# Patient Record
Sex: MALE | Race: BLACK OR AFRICAN AMERICAN | NOT HISPANIC OR LATINO | Employment: OTHER | URBAN - METROPOLITAN AREA
[De-identification: names, ages, dates, MRNs, and addresses within clinical notes are randomized per-mention and may not be internally consistent; named-entity substitution may affect disease eponyms.]

---

## 2019-07-10 ENCOUNTER — ANCILLARY PROCEDURE (OUTPATIENT)
Dept: GENERAL RADIOLOGY | Facility: CLINIC | Age: 64
End: 2019-07-10
Attending: NURSE PRACTITIONER
Payer: MEDICARE

## 2019-07-10 ENCOUNTER — OFFICE VISIT (OUTPATIENT)
Dept: OPTOMETRY | Facility: CLINIC | Age: 64
End: 2019-07-10
Payer: MEDICARE

## 2019-07-10 ENCOUNTER — OFFICE VISIT (OUTPATIENT)
Dept: URGENT CARE | Facility: URGENT CARE | Age: 64
End: 2019-07-10
Payer: MEDICARE

## 2019-07-10 VITALS
HEART RATE: 87 BPM | OXYGEN SATURATION: 99 % | TEMPERATURE: 98.5 F | SYSTOLIC BLOOD PRESSURE: 168 MMHG | RESPIRATION RATE: 20 BRPM | BODY MASS INDEX: 37.13 KG/M2 | WEIGHT: 226.6 LBS | DIASTOLIC BLOOD PRESSURE: 88 MMHG

## 2019-07-10 DIAGNOSIS — H25.813 COMBINED FORMS OF AGE-RELATED CATARACT OF BOTH EYES: ICD-10-CM

## 2019-07-10 DIAGNOSIS — R10.84 ABDOMINAL PAIN, GENERALIZED: Primary | ICD-10-CM

## 2019-07-10 DIAGNOSIS — R05.3 COUGH, PERSISTENT: ICD-10-CM

## 2019-07-10 DIAGNOSIS — H43.811 PVD (POSTERIOR VITREOUS DETACHMENT), RIGHT: Primary | ICD-10-CM

## 2019-07-10 PROCEDURE — 99203 OFFICE O/P NEW LOW 30 MIN: CPT | Performed by: NURSE PRACTITIONER

## 2019-07-10 PROCEDURE — 99203 OFFICE O/P NEW LOW 30 MIN: CPT | Performed by: OPTOMETRIST

## 2019-07-10 PROCEDURE — 74019 RADEX ABDOMEN 2 VIEWS: CPT

## 2019-07-10 PROCEDURE — 71046 X-RAY EXAM CHEST 2 VIEWS: CPT

## 2019-07-10 RX ORDER — NAPROXEN SODIUM 220 MG
220 TABLET ORAL 2 TIMES DAILY WITH MEALS
COMMUNITY
End: 2024-07-23

## 2019-07-10 ASSESSMENT — CUP TO DISC RATIO
OD_RATIO: 0.25
OS_RATIO: 0.25

## 2019-07-10 ASSESSMENT — VISUAL ACUITY
OD_SC: 20/30
OS_SC: 20/50
METHOD: SNELLEN - LINEAR
OD_PH_SC: 20/25
OS_PH_SC: 20/25

## 2019-07-10 ASSESSMENT — ENCOUNTER SYMPTOMS
SORE THROAT: 0
NAUSEA: 0
VOMITING: 0
COUGH: 1
RHINORRHEA: 0
SHORTNESS OF BREATH: 0
FEVER: 0
CHILLS: 0
DIARRHEA: 0
DIAPHORESIS: 0
ABDOMINAL PAIN: 1

## 2019-07-10 ASSESSMENT — SLIT LAMP EXAM - LIDS
COMMENTS: NORMAL
COMMENTS: NORMAL

## 2019-07-10 ASSESSMENT — EXTERNAL EXAM - RIGHT EYE: OD_EXAM: NORMAL

## 2019-07-10 ASSESSMENT — TONOMETRY
OS_IOP_MMHG: 15
OD_IOP_MMHG: 15
IOP_METHOD: APPLANATION

## 2019-07-10 ASSESSMENT — CONF VISUAL FIELD
OS_NORMAL: 1
OD_NORMAL: 1

## 2019-07-10 ASSESSMENT — EXTERNAL EXAM - LEFT EYE: OS_EXAM: NORMAL

## 2019-07-10 NOTE — PROGRESS NOTES
SUBJECTIVE:   Maynor Calero is a 64 year old male presenting with a chief complaint of   Chief Complaint   Patient presents with     Requesting X-Ray's     Patient requesting stomach and chest x-ray's- has not been feeling well since he had colonoscopy done a couple years ago. Patient also ate at [employer] on Friday and became very sick after.        He is a new patient of [facility].    URI Adult    Onset of symptoms was 6 day(s) ago.  Course of illness is worsening.    Severity moderate  Current and Associated symptoms: cough - non-productive and fatigue  Treatment measures tried include None tried.  Predisposing factors include None.   Abdominal pain  Duration 1 week  Current symptoms: too much gas, mild generalized abdominal  pain  Severity: moderate  Treatment tried: Non    Review of Systems   Constitutional: Negative for chills, diaphoresis and fever.   HENT: Negative for congestion, ear pain, rhinorrhea and sore throat.    Respiratory: Positive for cough. Negative for shortness of breath.    Gastrointestinal: Positive for abdominal pain. Negative for diarrhea, nausea and vomiting.   All other systems reviewed and are negative.      History reviewed. No pertinent past medical history.  History reviewed. No pertinent family history.  Current Outpatient Medications   Medication Sig Dispense Refill     naproxen sodium (ALEVE) 220 MG tablet Take 220 mg by mouth 2 times daily (with meals)       ibuprofen (ADVIL,MOTRIN) 800 MG tablet Take 1 tablet (800 mg) by mouth every 8 hours as needed for pain (Patient not taking: Reported on 7/10/2019) 90 tablet 1     metoprolol (LOPRESSOR) 25 MG tablet Take 1 tablet (25 mg) by mouth 2 times daily (Patient not taking: Reported on 7/10/2019) 60 tablet 8     sildenafil (VIAGRA) 50 MG tablet Take 0.5-1 tablets by mouth daily as needed for erectile dysfunction. Take 30 min to 4 hours before intercourse.  Never use with nitroglycerin, terazosin or doxazosin. (Patient not taking:  Reported on 7/10/2019) 6 tablet 1     Social History     Tobacco Use     Smoking status: Never Smoker     Smokeless tobacco: Never Used   Substance Use Topics     Alcohol use: Yes       OBJECTIVE  /88   Pulse 87   Temp 98.5  F (36.9  C) (Oral)   Resp 20   Wt 102.8 kg (226 lb 9.6 oz)   SpO2 99%   BMI 37.13 kg/m      Physical Exam   Constitutional: He appears well-developed and well-nourished. No distress.   HENT:   Head: Normocephalic and atraumatic.   Right Ear: Tympanic membrane and external ear normal.   Left Ear: Tympanic membrane and external ear normal.   Mouth/Throat: Oropharynx is clear and moist.   Eyes: Pupils are equal, round, and reactive to light. EOM are normal.   Neck: Normal range of motion. Neck supple.   Cardiovascular: Normal rate and normal heart sounds.   Pulmonary/Chest: Effort normal and breath sounds normal. No respiratory distress.   Abdominal: Soft. Bowel sounds are normal. He exhibits no distension. There is no tenderness.   Lymphadenopathy:     He has no cervical adenopathy.   Neurological: He is alert. No cranial nerve deficit.   Skin: Skin is warm and dry. He is not diaphoretic.   Psychiatric: He has a normal mood and affect.   Nursing note and vitals reviewed.      ASSESSMENT:      ICD-10-CM    1. Abdominal pain, generalized R10.84 XR Abdomen 2 Views   2. Cough, persistent R05 XR Chest 2 Views        Medical Decision Making:    Differential Diagnosis:  Abdominal Pain: GERD/Ulcer, indigestion    Serious Comorbid Conditions:  Adult:  None    PLAN:  I discussed xray results with the patient.  I recommend follow up with PCP in 3 days or sooner if symptoms are getting worse  All questions are answered and patient is in agreement with treatment plan  Isabel Pulido  [facility]  Family Nurse Practitoner

## 2019-07-10 NOTE — LETTER
7/10/2019         RE: Maynor Calero  [address]  [place] [address]        Dear Colleague,    Thank you for referring your patient, Maynor Calero, to the [facility]. Please see a copy of my visit note below.    Chief Complaint   Patient presents with     Floaters       Do you wear contact lenses? No        Yanni Mckeon, Optometric Tech     See Review Of Systems   Eyes: floaters  Constitutional: No fevers, chills, or weight changes.      Medical, surgical and family histories reviewed and updated 7/10/2019.         OBJECTIVE: See Ophthalmology exam    ASSESSMENT:    ICD-10-CM    1. PVD (posterior vitreous detachment), right H43.811    2. 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  This can sometimes cause holes or tears in the retina.  The signs of a retinal detachment are flashes of light or a curtain coming over the vision. If you notice any of these changes please let me know right away.  If I am not available this is an emergency type situation that you would need to be seen.    Floaters are small specks or clouds that move in your vision. They may appear to dart away when you try to look directly at them. They are most noticeable in bright light when looking at a blank wall, a solid colored surface, or the jessika.    These happen with age as the vitreous gel ( the fluid that fills the eyeball), starts to become more liquified and shrinks to form clumps or strands. The gel then pulls away from the back of the retina leaving a clump of tissue where the vitreous was previously attached, causing a posterior vitreous detachment.   This is more common with people that are nearsighted or have undergone certain ocular surgeries, inflammatory conditions or experienced trauma.  You should always be checked by an eye  doctor right away if you have a sudden change in floaters, flashes, or change/ loss in peripheral vision. This could indicate a retinal tear, hole or detachment that could lead to permanent vision loss if not treated.    You have the formation of cataracts.  You may notice some blurred vision or glare with night driving.  It is important that you wear good sunglasses to protect your eyes from the ultraviolet light from the sun. I recommend that you return in 1 year for an eye exam unless there are any sudden changes in your vision.       Return to clinic for comprehensive exam at your convenience, or sooner if visual changes present.       Moe Geller O.D.  [address]. [facility] [place]  [address]    [phone]             Again, thank you for allowing me to participate in the care of your patient.        Sincerely,        Moe Geller, OD

## 2019-07-10 NOTE — PATIENT INSTRUCTIONS
You have a PVD- posterior vitreous detachment which is due to the gel of the eye shrinking and clumping together.  This can sometimes cause holes or tears in the retina.  The signs of a retinal detachment are flashes of light or a curtain coming over the vision. If you notice any of these changes please let me know right away.  If I am not available this is an emergency type situation that you would need to be seen.    Floaters are small specks or clouds that move in your vision. They may appear to dart away when you try to look directly at them. They are most noticeable in bright light when looking at a blank wall, a solid colored surface, or the jessika.    These happen with age as the vitreous gel ( the fluid that fills the eyeball), starts to become more liquified and shrinks to form clumps or strands. The gel then pulls away from the back of the retina leaving a clump of tissue where the vitreous was previously attached, causing a posterior vitreous detachment.   This is more common with people that are nearsighted or have undergone certain ocular surgeries, inflammatory conditions or experienced trauma.  You should always be checked by an eye doctor right away if you have a sudden change in floaters, flashes, or change/ loss in peripheral vision. This could indicate a retinal tear, hole or detachment that could lead to permanent vision loss if not treated.    You have the formation of cataracts.  You may notice some blurred vision or glare with night driving.  It is important that you wear good sunglasses to protect your eyes from the ultraviolet light from the sun. I recommend that you return in 1 year for an eye exam unless there are any sudden changes in your vision.       Return to clinic for comprehensive exam at your convenience, or sooner if visual changes present.       Moe Geller O.D.  [facility]  [address] [place] [address]. [place]  [address]    [phone]

## 2019-07-10 NOTE — PROGRESS NOTES
Chief Complaint   Patient presents with     Floaters       Do you wear contact lenses? Mey Mckeon, Optometric Tech     See Review Of Systems   Eyes: floaters  Constitutional: No fevers, chills, or weight changes.      Medical, surgical and family histories reviewed and updated 7/10/2019.         OBJECTIVE: See Ophthalmology exam    ASSESSMENT:    ICD-10-CM    1. PVD (posterior vitreous detachment), right H43.811    2. 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  This can sometimes cause holes or tears in the retina.  The signs of a retinal detachment are flashes of light or a curtain coming over the vision. If you notice any of these changes please let me know right away.  If I am not available this is an emergency type situation that you would need to be seen.    Floaters are small specks or clouds that move in your vision. They may appear to dart away when you try to look directly at them. They are most noticeable in bright light when looking at a blank wall, a solid colored surface, or the jessika.    These happen with age as the vitreous gel ( the fluid that fills the eyeball), starts to become more liquified and shrinks to form clumps or strands. The gel then pulls away from the back of the retina leaving a clump of tissue where the vitreous was previously attached, causing a posterior vitreous detachment.   This is more common with people that are nearsighted or have undergone certain ocular surgeries, inflammatory conditions or experienced trauma.  You should always be checked by an eye doctor right away if you have a sudden change in floaters, flashes, or change/ loss in peripheral vision. This could indicate a retinal tear, hole or detachment that could lead to permanent vision loss if not treated.    You have the formation of  cataracts.  You may notice some blurred vision or glare with night driving.  It is important that you wear good sunglasses to protect your eyes from the ultraviolet light from the sun. I recommend that you return in 1 year for an eye exam unless there are any sudden changes in your vision.       Return to clinic for comprehensive exam at your convenience, or sooner if visual changes present.       Moe Geller O.D.  [address]  [place]  [address]    [phone]

## 2023-10-03 ENCOUNTER — OFFICE VISIT (OUTPATIENT)
Dept: URGENT CARE | Facility: URGENT CARE | Age: 68
End: 2023-10-03
Payer: COMMERCIAL

## 2023-10-03 VITALS
RESPIRATION RATE: 16 BRPM | HEART RATE: 66 BPM | TEMPERATURE: 98.5 F | WEIGHT: 222 LBS | SYSTOLIC BLOOD PRESSURE: 167 MMHG | OXYGEN SATURATION: 100 % | DIASTOLIC BLOOD PRESSURE: 107 MMHG

## 2023-10-03 DIAGNOSIS — N50.89 SWELLING OF LEFT TESTICLE: Primary | ICD-10-CM

## 2023-10-03 PROCEDURE — 99207 PR NO CHARGE LOS: CPT | Performed by: NURSE PRACTITIONER

## 2023-10-07 ENCOUNTER — HOSPITAL ENCOUNTER (EMERGENCY)
Facility: CLINIC | Age: 68
Discharge: HOME | End: 2023-10-07
Attending: EMERGENCY MEDICINE | Admitting: EMERGENCY MEDICINE
Payer: COMMERCIAL

## 2023-10-07 ENCOUNTER — APPOINTMENT (OUTPATIENT)
Dept: ULTRASOUND IMAGING | Facility: CLINIC | Age: 68
End: 2023-10-07
Attending: EMERGENCY MEDICINE
Payer: COMMERCIAL

## 2023-10-07 VITALS
BODY MASS INDEX: 35.36 KG/M2 | WEIGHT: 220 LBS | RESPIRATION RATE: 16 BRPM | HEART RATE: 89 BPM | OXYGEN SATURATION: 97 % | HEIGHT: 66 IN | DIASTOLIC BLOOD PRESSURE: 89 MMHG | TEMPERATURE: 96.9 F | SYSTOLIC BLOOD PRESSURE: 177 MMHG

## 2023-10-07 DIAGNOSIS — N45.3 EPIDIDYMOORCHITIS: ICD-10-CM

## 2023-10-07 LAB
ALBUMIN UR-MCNC: NEGATIVE MG/DL
ANION GAP SERPL CALCULATED.3IONS-SCNC: 12 MMOL/L (ref 7–15)
APPEARANCE UR: CLEAR
BASO+EOS+MONOS # BLD AUTO: ABNORMAL 10*3/UL
BASO+EOS+MONOS NFR BLD AUTO: ABNORMAL %
BASOPHILS # BLD AUTO: 0 10E3/UL (ref 0–0.2)
BASOPHILS NFR BLD AUTO: 0 %
BILIRUB UR QL STRIP: NEGATIVE
BUN SERPL-MCNC: 8.6 MG/DL (ref 8–23)
CALCIUM SERPL-MCNC: 9.6 MG/DL (ref 8.8–10.2)
CHLORIDE SERPL-SCNC: 102 MMOL/L (ref 98–107)
COLOR UR AUTO: ABNORMAL
CREAT SERPL-MCNC: 1.06 MG/DL (ref 0.67–1.17)
CRP SERPL-MCNC: 176.01 MG/L
DEPRECATED HCO3 PLAS-SCNC: 23 MMOL/L (ref 22–29)
EGFRCR SERPLBLD CKD-EPI 2021: 75 ML/MIN/1.73M2
EOSINOPHIL # BLD AUTO: 0.2 10E3/UL (ref 0–0.7)
EOSINOPHIL NFR BLD AUTO: 2 %
ERYTHROCYTE [DISTWIDTH] IN BLOOD BY AUTOMATED COUNT: 12.4 % (ref 10–15)
ERYTHROCYTE [SEDIMENTATION RATE] IN BLOOD BY WESTERGREN METHOD: 63 MM/HR (ref 0–20)
GLUCOSE SERPL-MCNC: 167 MG/DL (ref 70–99)
GLUCOSE UR STRIP-MCNC: NEGATIVE MG/DL
HCT VFR BLD AUTO: 38.9 % (ref 40–53)
HGB BLD-MCNC: 12.1 G/DL (ref 13.3–17.7)
HGB UR QL STRIP: ABNORMAL
IMM GRANULOCYTES # BLD: 0.1 10E3/UL
IMM GRANULOCYTES NFR BLD: 1 %
KETONES UR STRIP-MCNC: NEGATIVE MG/DL
LEUKOCYTE ESTERASE UR QL STRIP: NEGATIVE
LYMPHOCYTES # BLD AUTO: 1.6 10E3/UL (ref 0.8–5.3)
LYMPHOCYTES NFR BLD AUTO: 15 %
MCH RBC QN AUTO: 27.1 PG (ref 26.5–33)
MCHC RBC AUTO-ENTMCNC: 31.1 G/DL (ref 31.5–36.5)
MCV RBC AUTO: 87 FL (ref 78–100)
MONOCYTES # BLD AUTO: 0.9 10E3/UL (ref 0–1.3)
MONOCYTES NFR BLD AUTO: 9 %
MUCOUS THREADS #/AREA URNS LPF: PRESENT /LPF
NEUTROPHILS # BLD AUTO: 7.4 10E3/UL (ref 1.6–8.3)
NEUTROPHILS NFR BLD AUTO: 73 %
NITRATE UR QL: NEGATIVE
NRBC # BLD AUTO: 0 10E3/UL
NRBC BLD AUTO-RTO: 0 /100
PH UR STRIP: 6.5 [PH] (ref 5–7)
PLATELET # BLD AUTO: 382 10E3/UL (ref 150–450)
POTASSIUM SERPL-SCNC: 3.7 MMOL/L (ref 3.4–5.3)
RBC # BLD AUTO: 4.47 10E6/UL (ref 4.4–5.9)
RBC URINE: 2 /HPF
SODIUM SERPL-SCNC: 137 MMOL/L (ref 135–145)
SP GR UR STRIP: 1.01 (ref 1–1.03)
SQUAMOUS EPITHELIAL: <1 /HPF
UROBILINOGEN UR STRIP-MCNC: NORMAL MG/DL
WBC # BLD AUTO: 10.2 10E3/UL (ref 4–11)
WBC URINE: 1 /HPF

## 2023-10-07 PROCEDURE — 86140 C-REACTIVE PROTEIN: CPT | Performed by: EMERGENCY MEDICINE

## 2023-10-07 PROCEDURE — 99284 EMERGENCY DEPT VISIT MOD MDM: CPT | Mod: 25

## 2023-10-07 PROCEDURE — 250N000013 HC RX MED GY IP 250 OP 250 PS 637: Performed by: EMERGENCY MEDICINE

## 2023-10-07 PROCEDURE — 81001 URINALYSIS AUTO W/SCOPE: CPT | Performed by: EMERGENCY MEDICINE

## 2023-10-07 PROCEDURE — 93976 VASCULAR STUDY: CPT

## 2023-10-07 PROCEDURE — 80048 BASIC METABOLIC PNL TOTAL CA: CPT | Performed by: EMERGENCY MEDICINE

## 2023-10-07 PROCEDURE — 85652 RBC SED RATE AUTOMATED: CPT | Performed by: EMERGENCY MEDICINE

## 2023-10-07 PROCEDURE — 36415 COLL VENOUS BLD VENIPUNCTURE: CPT | Performed by: EMERGENCY MEDICINE

## 2023-10-07 PROCEDURE — 85004 AUTOMATED DIFF WBC COUNT: CPT | Performed by: EMERGENCY MEDICINE

## 2023-10-07 RX ADMIN — AMOXICILLIN AND CLAVULANATE POTASSIUM 1 TABLET: 875; 125 TABLET, FILM COATED ORAL at 10:51

## 2023-10-07 ASSESSMENT — ACTIVITIES OF DAILY LIVING (ADL)
ADLS_ACUITY_SCORE: 35
ADLS_ACUITY_SCORE: 35

## 2023-10-07 NOTE — ED TRIAGE NOTES
Patient complains of testicular pain since Saturday. States testicle is now swollen and more painful. Denies urinary symptoms.     Triage Assessment       Row Name 10/07/23 0800       Triage Assessment (Adult)    Airway WDL WDL       Respiratory WDL    Respiratory WDL WDL       Skin Circulation/Temperature WDL    Skin Circulation/Temperature WDL WDL       Cardiac WDL    Cardiac WDL WDL       Peripheral/Neurovascular WDL    Peripheral Neurovascular WDL WDL       Cognitive/Neuro/Behavioral WDL    Cognitive/Neuro/Behavioral WDL WDL

## 2023-10-07 NOTE — DISCHARGE INSTRUCTIONS
1. -Take acetaminophen 500 to 1000 mg by mouth every 4 to 6 hours as needed for pain or fever.  Do not take more than 4000 mg in 24 hours.  Do not take within 6 hours of another acetaminophen containing medication such as norco (vicodin) or percocet.  - Take ibuprofen 600 to 800 mg by mouth every 6 to 8 hours as needed for pain or fever  2. Elevate scrotum with a washcloth or towel when resting and wear supportive underwear  3. Follow-up with urology for reevaluation  4. You may return to the ED as needed for new or worsening symptoms such as fever greater than 100.4  F, severe and uncontrollable pain, unable to urinate, abdominal pain, swelling and pain behind her scrotum, any other concerning symptoms.    Continue Bactrim as previously prescribed and finish entire course.

## 2023-10-07 NOTE — ED PROVIDER NOTES
"  History     Chief Complaint:  Testicular/scrotal Pain       HPI   Maynor Calero is a 68 year old male with a past medical history significant for status post right inguinal hernia repair who presents to the ED via/accompanied by self with a chief complaint of left testicular/scrotal pain onset 1 week ago, nonradiating, gradually worsening.  Patient denies fevers, chills, changes in urination, changes in bowel movements, abdominal pain, nausea, vomiting, trauma.    Patient denies tobacco, alcohol use.    Independent Historian: history provided by the patient    Review of External Notes: See MDM    ROS:  Review of Systems  Full ROS completed and negative other than pertinent positives and negatives noted in HPI    Allergies:  No Known Allergies     Medications:    ibuprofen (ADVIL,MOTRIN) 800 MG tablet  metoprolol (LOPRESSOR) 25 MG tablet  naproxen sodium (ALEVE) 220 MG tablet  sildenafil (VIAGRA) 50 MG tablet        Past Medical History:    No past medical history on file.    Past Surgical History:    No past surgical history on file.     Family History:    family history is not on file.    Social History:   reports that he has never smoked. He has never used smokeless tobacco. He reports current alcohol use.  PCP: [facility]     Physical Exam   Patient Vitals for the past 24 hrs:   BP Temp Temp src Pulse Resp SpO2 Height Weight   10/07/23 0758 (!) 148/93 96.9  F (36.1  C) Temporal 118 16 99 % 1.676 m (5' 6\") 99.8 kg (220 lb)        Physical Exam  Constitutional: Well developed, nontox appearance  Head: Atraumatic.   Neck:  no stridor  Eyes: no scleral icterus  Cardiovascular: RRR, 2+ bilat radial pulses  Pulmonary/Chest: nml resp effort  Abdominal: ND, soft, NT, no rebound or guarding   : no CVA tenderness bilat, left scrotal swelling and tenderness, right testicle nontender without swelling, no perineal induration or tenderness  Ext: Warm, well perfused, no edema  Neurological: A&O, symmetric " facies, moves ext x4  Skin: Skin is warm and dry.   Psychiatric: Behavior is normal. Thought content normal.   Nursing note and vitals reviewed.    Emergency Department Course   ECG:  No results found for this or any previous visit.    Imaging:  No orders to display        Report per radiology unless otherwise specified in report or noted in MDM    Laboratory:  Labs Ordered and Resulted from Time of ED Arrival to Time of ED Departure - No data to display     Procedures       Emergency Department Course & Assessments:             Interventions:  Medications - No data to display     Independent Interpretation (X-rays, CTs, rhythm strip):  See MDM    Consultations/Discussion of Management or Tests:  none     Social Determinants of Health affecting care:  See MDM    Disposition:  The patient was discharged to home.     Impression & Plan    CMS Diagnoses: none    Medical Decision Makin year old male presenting w/ scrotal pain and swelling    Social determinants affecting patient's health include: Age increasing risk for presentation to the emergency department     I reviewed medical records from urgent care office visit on 10/3/2023, family medicine office visit on 2021; discharge summary from [facility] on 10/6/23    DDx includes scrotal cellulitis, orchitis, epididymitis, scrotal abscess, Rosi's gangrene although less likely given physical exam, incarcerated hernia.   Labs significant for leukocytosis, elevated CRP, UA without infection.  Imaging sig for left epididymoorchitis without evidence of fluid collection.  I reviewed the patient's discharge summary from [facility].  His labs seem to be improving and there is no fluid collection on today's ultrasound.  Patient is currently on Bactrim for outpatient management.  I explained to the patient that he has technically not failed treatment with Bactrim and he seems to be improving particularly in consideration of his lab results and ultrasound.   Advised that he continue with the Bactrim follow-up with urology.  Urology referral was placed prior to discharge.  Patient also reported that he is being followed and they are listening devices in his home.  He sounds delusional although not an acute danger to himself or others.  I offered mental health evaluation in the emergency department with [facility] for the patient declined.  Patient was subsequently discharged in stable condition.  Pt counseled on all results, disposition and diagnosis.  They are understanding and agreeable to plan.      Diagnosis:  No diagnosis found.     Discharge Medications:  New Prescriptions    No medications on file        10/7/2023   Zeus Ruvalcaba MD Vaughn, Christopher E, MD  10/07/23 5904

## 2024-07-23 ENCOUNTER — ANCILLARY PROCEDURE (OUTPATIENT)
Dept: GENERAL RADIOLOGY | Facility: CLINIC | Age: 69
End: 2024-07-23
Attending: PHYSICIAN ASSISTANT
Payer: COMMERCIAL

## 2024-07-23 ENCOUNTER — TELEPHONE (OUTPATIENT)
Dept: FAMILY MEDICINE | Facility: CLINIC | Age: 69
End: 2024-07-23

## 2024-07-23 ENCOUNTER — OFFICE VISIT (OUTPATIENT)
Dept: FAMILY MEDICINE | Facility: CLINIC | Age: 69
End: 2024-07-23
Payer: COMMERCIAL

## 2024-07-23 VITALS
RESPIRATION RATE: 16 BRPM | WEIGHT: 226 LBS | HEIGHT: 66 IN | OXYGEN SATURATION: 100 % | BODY MASS INDEX: 36.32 KG/M2 | DIASTOLIC BLOOD PRESSURE: 88 MMHG | SYSTOLIC BLOOD PRESSURE: 140 MMHG | HEART RATE: 99 BPM | TEMPERATURE: 99.1 F

## 2024-07-23 DIAGNOSIS — E66.812 CLASS 2 SEVERE OBESITY DUE TO EXCESS CALORIES WITH SERIOUS COMORBIDITY AND BODY MASS INDEX (BMI) OF 36.0 TO 36.9 IN ADULT (H): ICD-10-CM

## 2024-07-23 DIAGNOSIS — Z12.11 SCREEN FOR COLON CANCER: Primary | ICD-10-CM

## 2024-07-23 DIAGNOSIS — N30.00 ACUTE CYSTITIS WITHOUT HEMATURIA: ICD-10-CM

## 2024-07-23 DIAGNOSIS — R05.1 ACUTE COUGH: ICD-10-CM

## 2024-07-23 DIAGNOSIS — R35.1 FREQUENT URINATION AT NIGHT: ICD-10-CM

## 2024-07-23 DIAGNOSIS — S39.012A STRAIN OF LUMBAR REGION, INITIAL ENCOUNTER: Primary | ICD-10-CM

## 2024-07-23 DIAGNOSIS — E66.01 CLASS 2 SEVERE OBESITY DUE TO EXCESS CALORIES WITH SERIOUS COMORBIDITY AND BODY MASS INDEX (BMI) OF 36.0 TO 36.9 IN ADULT (H): ICD-10-CM

## 2024-07-23 DIAGNOSIS — Z12.5 ENCOUNTER FOR SCREENING FOR MALIGNANT NEOPLASM OF PROSTATE: ICD-10-CM

## 2024-07-23 DIAGNOSIS — I10 HYPERTENSION GOAL BP (BLOOD PRESSURE) < 140/90: ICD-10-CM

## 2024-07-23 DIAGNOSIS — Z11.59 ENCOUNTER FOR HEPATITIS C SCREENING TEST FOR LOW RISK PATIENT: ICD-10-CM

## 2024-07-23 DIAGNOSIS — F20.0 PARANOID SCHIZOPHRENIA (H): ICD-10-CM

## 2024-07-23 DIAGNOSIS — S39.012A STRAIN OF LUMBAR REGION, INITIAL ENCOUNTER: ICD-10-CM

## 2024-07-23 LAB
ALBUMIN UR-MCNC: NEGATIVE MG/DL
ANION GAP SERPL CALCULATED.3IONS-SCNC: 9 MMOL/L (ref 7–15)
APPEARANCE UR: ABNORMAL
BACTERIA #/AREA URNS HPF: ABNORMAL /HPF
BILIRUB UR QL STRIP: NEGATIVE
BUN SERPL-MCNC: 11.9 MG/DL (ref 8–23)
CALCIUM SERPL-MCNC: 9.9 MG/DL (ref 8.8–10.4)
CHLORIDE SERPL-SCNC: 104 MMOL/L (ref 98–107)
COLOR UR AUTO: YELLOW
CREAT SERPL-MCNC: 1.1 MG/DL (ref 0.67–1.17)
EGFRCR SERPLBLD CKD-EPI 2021: 72 ML/MIN/1.73M2
ERYTHROCYTE [DISTWIDTH] IN BLOOD BY AUTOMATED COUNT: 12.7 % (ref 10–15)
GLUCOSE SERPL-MCNC: 156 MG/DL (ref 70–99)
GLUCOSE UR STRIP-MCNC: NEGATIVE MG/DL
HCO3 SERPL-SCNC: 28 MMOL/L (ref 22–29)
HCT VFR BLD AUTO: 39.9 % (ref 40–53)
HCV AB SERPL QL IA: NONREACTIVE
HGB BLD-MCNC: 12.5 G/DL (ref 13.3–17.7)
HGB UR QL STRIP: ABNORMAL
KETONES UR STRIP-MCNC: NEGATIVE MG/DL
LEUKOCYTE ESTERASE UR QL STRIP: ABNORMAL
MCH RBC QN AUTO: 27.6 PG (ref 26.5–33)
MCHC RBC AUTO-ENTMCNC: 31.3 G/DL (ref 31.5–36.5)
MCV RBC AUTO: 88 FL (ref 78–100)
NITRATE UR QL: NEGATIVE
PH UR STRIP: 6 [PH] (ref 5–7)
PLATELET # BLD AUTO: 375 10E3/UL (ref 150–450)
POTASSIUM SERPL-SCNC: 4.9 MMOL/L (ref 3.4–5.3)
PSA SERPL DL<=0.01 NG/ML-MCNC: 3.83 NG/ML (ref 0–6.5)
RBC # BLD AUTO: 4.53 10E6/UL (ref 4.4–5.9)
RBC #/AREA URNS AUTO: ABNORMAL /HPF
SODIUM SERPL-SCNC: 141 MMOL/L (ref 135–145)
SP GR UR STRIP: 1.01 (ref 1–1.03)
SQUAMOUS #/AREA URNS AUTO: ABNORMAL /LPF
UROBILINOGEN UR STRIP-ACNC: 0.2 E.U./DL
WBC # BLD AUTO: 13.2 10E3/UL (ref 4–11)
WBC #/AREA URNS AUTO: ABNORMAL /HPF
WBC CLUMPS #/AREA URNS HPF: PRESENT /HPF

## 2024-07-23 PROCEDURE — 87186 SC STD MICRODIL/AGAR DIL: CPT | Performed by: PHYSICIAN ASSISTANT

## 2024-07-23 PROCEDURE — G0103 PSA SCREENING: HCPCS | Performed by: PHYSICIAN ASSISTANT

## 2024-07-23 PROCEDURE — 80048 BASIC METABOLIC PNL TOTAL CA: CPT | Performed by: PHYSICIAN ASSISTANT

## 2024-07-23 PROCEDURE — G2211 COMPLEX E/M VISIT ADD ON: HCPCS | Performed by: PHYSICIAN ASSISTANT

## 2024-07-23 PROCEDURE — 36415 COLL VENOUS BLD VENIPUNCTURE: CPT | Performed by: PHYSICIAN ASSISTANT

## 2024-07-23 PROCEDURE — 99214 OFFICE O/P EST MOD 30 MIN: CPT | Performed by: PHYSICIAN ASSISTANT

## 2024-07-23 PROCEDURE — 87086 URINE CULTURE/COLONY COUNT: CPT | Performed by: PHYSICIAN ASSISTANT

## 2024-07-23 PROCEDURE — 86803 HEPATITIS C AB TEST: CPT | Performed by: PHYSICIAN ASSISTANT

## 2024-07-23 PROCEDURE — 85027 COMPLETE CBC AUTOMATED: CPT | Performed by: PHYSICIAN ASSISTANT

## 2024-07-23 PROCEDURE — 81001 URINALYSIS AUTO W/SCOPE: CPT | Performed by: PHYSICIAN ASSISTANT

## 2024-07-23 PROCEDURE — 71046 X-RAY EXAM CHEST 2 VIEWS: CPT | Mod: TC | Performed by: RADIOLOGY

## 2024-07-23 RX ORDER — TAMSULOSIN HYDROCHLORIDE 0.4 MG/1
0.4 CAPSULE ORAL DAILY
Qty: 90 CAPSULE | Refills: 3 | Status: SHIPPED | OUTPATIENT
Start: 2024-07-23

## 2024-07-23 RX ORDER — GUAIFENESIN AND DEXTROMETHORPHAN HYDROBROMIDE 1200; 60 MG/1; MG/1
1 TABLET, EXTENDED RELEASE ORAL 2 TIMES DAILY
Qty: 28 TABLET | Refills: 0 | Status: SHIPPED | OUTPATIENT
Start: 2024-07-23

## 2024-07-23 RX ORDER — AMLODIPINE BESYLATE 10 MG/1
10 TABLET ORAL DAILY
Qty: 90 TABLET | Refills: 3 | Status: SHIPPED | OUTPATIENT
Start: 2024-07-23

## 2024-07-23 RX ORDER — AZITHROMYCIN 250 MG/1
TABLET, FILM COATED ORAL
Qty: 6 TABLET | Refills: 0 | Status: SHIPPED | OUTPATIENT
Start: 2024-07-23 | End: 2024-09-13

## 2024-07-23 RX ORDER — SULFAMETHOXAZOLE/TRIMETHOPRIM 800-160 MG
1 TABLET ORAL 2 TIMES DAILY
Qty: 20 TABLET | Refills: 0 | Status: SHIPPED | OUTPATIENT
Start: 2024-07-23 | End: 2024-08-02

## 2024-07-23 RX ORDER — AMLODIPINE BESYLATE 10 MG/1
1 TABLET ORAL DAILY
COMMUNITY
End: 2024-07-23

## 2024-07-23 RX ORDER — NAPROXEN 500 MG/1
500 TABLET ORAL 2 TIMES DAILY PRN
Qty: 30 TABLET | Refills: 1 | Status: SHIPPED | OUTPATIENT
Start: 2024-07-23

## 2024-07-23 RX ORDER — TAMSULOSIN HYDROCHLORIDE 0.4 MG/1
0.4 CAPSULE ORAL DAILY
COMMUNITY
End: 2024-07-23

## 2024-07-23 RX ORDER — METHOCARBAMOL 500 MG/1
500 TABLET, FILM COATED ORAL 4 TIMES DAILY PRN
Qty: 30 TABLET | Refills: 1 | Status: SHIPPED | OUTPATIENT
Start: 2024-07-23 | End: 2024-07-23

## 2024-07-23 RX ORDER — GABAPENTIN 100 MG/1
100 CAPSULE ORAL 3 TIMES DAILY
COMMUNITY
Start: 2023-10-06 | End: 2024-07-23

## 2024-07-23 ASSESSMENT — PATIENT HEALTH QUESTIONNAIRE - PHQ9
10. IF YOU CHECKED OFF ANY PROBLEMS, HOW DIFFICULT HAVE THESE PROBLEMS MADE IT FOR YOU TO DO YOUR WORK, TAKE CARE OF THINGS AT HOME, OR GET ALONG WITH OTHER PEOPLE: NOT DIFFICULT AT ALL
SUM OF ALL RESPONSES TO PHQ QUESTIONS 1-9: 8
SUM OF ALL RESPONSES TO PHQ QUESTIONS 1-9: 8

## 2024-07-23 ASSESSMENT — ENCOUNTER SYMPTOMS: FEVER: 1

## 2024-07-23 ASSESSMENT — PAIN SCALES - GENERAL: PAINLEVEL: MODERATE PAIN (5)

## 2024-07-23 NOTE — TELEPHONE ENCOUNTER
Pharmacist calling to state the patient's prescription for muscle relaxer is not covered by insurance.  They prefer the patient to have cyclobenzaprine or tizanidine instead.      Prescription was sent over today to the pharmacy.      Routing to provider to review and advise.    Kristina Kjellberg, MSN, RN  [facility] Primary Care Triage

## 2024-07-23 NOTE — TELEPHONE ENCOUNTER
Pt called back.    Relayed provider's note bellow.    Pt understands to hold Zpack and to start bactrim. No questions at this time.    BULL DianaN, RN  [facility]

## 2024-07-23 NOTE — RESULT ENCOUNTER NOTE
Please call the patient with these results:      Urinalysis shows bladder infection.please  hold off z-pack and take Bactrim 1 tab twice a day  for 10 days for UTI treatment. Prescription was sent to pharmacy. Please finish all 10 days. If not better after 3 days on Bactrim , please follow up.     Chest xray was negative for pneumonia.

## 2024-07-23 NOTE — PATIENT INSTRUCTIONS
Cough:   Z-pack - 5 days  Mucinex Dm 1 tablet twice a day for 7-14 days   Increase water intake    Blood pressure - take Amlodipine 10 mg once a day  Follow up in 1 month to recheck     For urination-  Tamsulosin 0.4 mg once daily   Follow up with urology if not better in 1 month    Low Back   Methocarbamol 500 mg 3-4 times a day as needed for muscle spasms  Naproxen 500 mg twice a day as needed for pain

## 2024-07-23 NOTE — PROGRESS NOTES
Assessment & Plan   Problem List Items Addressed This Visit          Digestive    Class 2 severe obesity due to excess calories with serious comorbidity in adult (H)       Circulatory    Hypertension goal BP (blood pressure) < 140/90    Relevant Medications    amLODIPine (NORVASC) 10 MG tablet    Other Relevant Orders    Basic metabolic panel  (Ca, Cl, CO2, Creat, Gluc, K, Na, BUN)    CBC with platelets (Completed)       Behavioral    Paranoid schizophrenia (H)       Other    Frequent urination at night    Relevant Medications    tamsulosin (FLOMAX) 0.4 MG capsule    Other Relevant Orders    PSA, screen    Urine Culture Aerobic Bacterial - lab collect    Adult Urology  Referral    UA with Microscopic - lab collect (Completed)    Urine Microscopic Exam (Completed)     Other Visit Diagnoses       Screen for colon cancer    -  Primary    Relevant Orders    Colonoscopy Screening  Referral    Acute cough        Relevant Medications    azithromycin (ZITHROMAX) 250 MG tablet    Dextromethorphan-Guaifenesin  MG TB12    Other Relevant Orders    XR Chest 2 Views (Completed)    Encounter for hepatitis C screening test for low risk patient        Relevant Orders    Hepatitis C Screen Reflex to HCV RNA Quant and Genotype    Strain of lumbar region, initial encounter        Relevant Medications    methocarbamol (ROBAXIN) 500 MG tablet    naproxen (NAPROSYN) 500 MG tablet    Encounter for screening for malignant neoplasm of prostate        Relevant Orders    PSA, screen           Cough:   Z-pack - 5 days  Mucinex Dm 1 tablet twice a day for 7-14 days   Increase water intake    Blood pressure - take Amlodipine 10 mg once a day  Follow up in 1 month to recheck     For urination-ua shows UTI  Bactrim DS 1 tablet twice a day for 10 days     Tamsulosin 0.4 mg once daily   Follow up with urology if not better in 1 month    Low Back   Methocarbamol 500 mg 3-4 times a day as needed for muscle spasms  Naproxen  "500 mg twice a day as needed for pain    Patient was advised to hold off z-pack and take Bactrim 1 tab twice a day  for 10 days for UTI treatment.        BMI  Estimated body mass index is 36.48 kg/m  as calculated from the following:    Height as of this encounter: 1.676 m (5' 6\").    Weight as of this encounter: 102.5 kg (226 lb).   Weight management plan: Discussed healthy diet and exercise guidelines          Chandan Mcguire is a 69 year old, presenting for the following health issues:  Fever        7/23/2024     8:39 AM   Additional Questions   Roomed by don   Accompanied by self     History of Present Illness       Back Pain:  He presents for follow up of back pain. Patient's back pain is a new problem.    Original cause of back pain: not sure  First noticed back pain: 1-4 weeks ago  Patient feels back pain: comes and goesLocation of back pain:  Right lower back  Description of back pain: dull ache  Back pain spreads: nowhere    Since patient first noticed back pain, pain is: gradually worsening  Does back pain interfere with his job:  Not applicable  On a scale of 1-10 (10 being the worst), patient describes pain as:  5  What makes back pain worse: certain positions   Acupuncture: not tried  Acetaminophen: not tried  Activity or exercise: not tried  Chiropractor:  Not tried  Cold: not tried  Heat: not tried  Massage: not tried  Muscle relaxants: not tried  NSAIDS: not tried  Opioids: not tried  Physical Therapy: not tried  Rest: not tried  Steroid Injection: not tried  Stretching: not tried  Surgery: not tried  TENS unit: not tried  Topical pain relievers: not tried  Other healthcare providers patient is seeing for back pain: None    Headaches:   Since the patient's last clinic visit, headaches are: worsened  The patient is getting headaches:  Not often  He is able to do normal daily activities when he has a migraine.  The patient is taking the following rescue/relief medications:  No rescue/relief " "medications   Patient states \"I get total relief\" from the rescue/relief medications.   The patient is taking the following medications to prevent migraines:  No medications to prevent migraines  In the past 4 weeks, the patient has gone to an Urgent Care or Emergency Room 0 times times due to headaches.    Reason for visit:  Fever  Symptom onset:  1-2 weeks ago  Symptoms include:  Fever  Symptom intensity:  Moderate  Symptom progression:  Worsening  Had these symptoms before:  No  What makes it worse:  No  What makes it better:  No          Acute Illness  Acute illness concerns: fever, cough, frequent urination  Onset/Duration: 2 weeks   Symptoms:   Fever: YES- 99.1 low grade   Chills/Sweats: YES  Headache (location?): YES  Sinus Pressure: No  Conjunctivitis:  No  Ear Pain: no  Rhinorrhea: No  Congestion: No  Sore Throat: No  Cough: YES-productive of clear sputum  Wheeze: No  Decreased Appetite: No  Nausea: No  Vomiting: No  Diarrhea: No  Dysuria/Freq.: No  Dysuria or Hematuria: No  Fatigue/Achiness: YES  Sick/Strep Exposure: 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    Urinary frequency: patient also states he has been trouble with urinating for 3 weeks; states it used to burn but now has improved, just notices frequent urination but mostly at night. Patient denies testicular or abdominal pain. Patient used tot ciera Flomax and remembers that symptom were well controlled, but he has ran out of medication           Review of Systems  Constitutional, HEENT, cardiovascular, pulmonary, gi and gu systems are negative, except as otherwise noted.      Objective    BP (!) 140/88 (BP Location: Left arm, Patient Position: Sitting, Cuff Size: Adult Large)   Pulse 99   Temp 99.1  F (37.3  C) (Oral)   Resp 16   Ht 1.676 m (5' 6\")   Wt " 102.5 kg (226 lb)   SpO2 100%   BMI 36.48 kg/m    Body mass index is 36.48 kg/m .    Physical Exam   GENERAL: alert and no distress  EYES: Eyes grossly normal to inspection, PERRL and conjunctivae and sclerae normal  HENT: normal cephalic/atraumatic, ear canals and TM's normal, nose and mouth without ulcers or lesions, , oral mucous membranes moist, and yellow PND in oropharynx   NECK: no adenopathy, no asymmetry, masses, or scars  RESP: lungs clear to auscultation - no rales, rhonchi or wheezes  CV: regular rate and rhythm, normal S1 S2, no S3 or S4, no murmur, click or rub, no peripheral edema  ABDOMEN: soft, nontender, no hepatosplenomegaly, no masses and bowel sounds normal  MS: no gross musculoskeletal defects noted, no edema  ORTHO: Lumber/Thoracic Spine Exam: Tender:  left parathoracic muscles, right parathoracic muscles  Non-tender:  thoracic spinous processes, lumbar spinous processes, left SI joint, right SI joint  Range of Motion:  lumbar flexion  decreased, lumbar extension  decreased  Strength:  hamstrings  5/5, quadriceps  5/5  Special tests:  negative straight leg raises    Hip Exam: Hip ROM full    Diagnostic Test Results:  Results for orders placed or performed in visit on 07/23/24 (from the past 24 hour(s))   UA with Microscopic - lab collect   Result Value Ref Range    Color Urine Yellow Colorless, Straw, Light Yellow, Yellow    Appearance Urine Cloudy (A) Clear    Glucose Urine Negative Negative mg/dL    Bilirubin Urine Negative Negative    Ketones Urine Negative Negative mg/dL    Specific Gravity Urine 1.015 1.003 - 1.035    Blood Urine Trace (A) Negative    pH Urine 6.0 5.0 - 7.0    Protein Albumin Urine Negative Negative mg/dL    Urobilinogen Urine 0.2 0.2, 1.0 E.U./dL    Nitrite Urine Negative Negative    Leukocyte Esterase Urine Large (A) Negative   Urine Microscopic Exam   Result Value Ref Range    Bacteria Urine Many (A) None Seen /HPF    RBC Urine 5-10 (A) 0-2 /HPF /HPF    WBC Urine  25-50 (A) 0-5 /HPF /HPF    Squamous Epithelials Urine Few (A) None Seen /LPF    WBC Clumps Urine Present (A) None Seen /HPF   CBC with platelets   Result Value Ref Range    WBC Count 13.2 (H) 4.0 - 11.0 10e3/uL    RBC Count 4.53 4.40 - 5.90 10e6/uL    Hemoglobin 12.5 (L) 13.3 - 17.7 g/dL    Hematocrit 39.9 (L) 40.0 - 53.0 %    MCV 88 78 - 100 fL    MCH 27.6 26.5 - 33.0 pg    MCHC 31.3 (L) 31.5 - 36.5 g/dL    RDW 12.7 10.0 - 15.0 %    Platelet Count 375 150 - 450 10e3/uL       CXR - Reviewed and interpreted by me Normal- no infiltrates, effusions, pneumothoraces, cardiomegaly or masses        Signed Electronically by: Dee Dee Lee PA-C

## 2024-07-23 NOTE — TELEPHONE ENCOUNTER
This writer attempted to contact patient on 07/23/24      Reason for call results and left message.      If patient calls back:    Please call the patient with these results:      Urinalysis shows bladder infection.please  hold off z-pack and take Bactrim 1 tab twice a day  for 10 days for UTI treatment. Prescription was sent to pharmacy. Please finish all 10 days. If not better after 3 days on Bactrim , please follow up.     Chest xray was negative for pneumonia.    Kisha Ly RN

## 2024-07-24 LAB — BACTERIA UR CULT: ABNORMAL

## 2024-09-13 DIAGNOSIS — R05.1 ACUTE COUGH: ICD-10-CM

## 2024-09-13 RX ORDER — AZITHROMYCIN 250 MG/1
TABLET, FILM COATED ORAL
Qty: 6 TABLET | Refills: 0 | Status: SHIPPED | OUTPATIENT
Start: 2024-09-13

## 2024-09-24 ENCOUNTER — TELEPHONE (OUTPATIENT)
Dept: FAMILY MEDICINE | Facility: CLINIC | Age: 69
End: 2024-09-24
Payer: COMMERCIAL

## 2024-09-24 NOTE — LETTER
September 24, 2024    Maynor Calero  [address]  [facility] [address]    Dear Maynor Calero,     At [facility] we care about your health and are committed to providing quality patient care.    Which includes staying current on preventive cancer screenings.  You can increase your chances of finding and treating cancers through regular screenings.      Our records indicate you may be due for the following preventive screening(s):  Colon Cancer Screening  Physical Annual Wellness Visit      Topic Date Due    Zoster (Shingles) Vaccine (1 of 2) Never done    Pneumococcal Vaccine (2 of 2 - PCV) 06/03/2022    Flu Vaccine (1) Never done    COVID-19 Vaccine (1 - 2024-25 season) Never done       To schedule an appointment or discuss this screening further, you may contact us by phone at the [facility] at [phone] or online through the patient [email or web address]    If you have had any of the screenings listed above at another facility, please call us so that we may update your chart.      Your partners in health,      Quality Committee at [facility]

## 2024-09-24 NOTE — TELEPHONE ENCOUNTER
Patient Quality Outreach    Patient is due for the following:   Colon Cancer Screening  Physical Annual Wellness Visit      Topic Date Due    Zoster (Shingles) Vaccine (1 of 2) Never done    Pneumococcal Vaccine (2 of 2 - PCV) 06/03/2022    Flu Vaccine (1) Never done    COVID-19 Vaccine (1 - 2024-25 season) Never done       Next Steps:   Schedule a     Type of outreach:    Sent [employer] message. and Sent letter.      Questions for provider review:    None           Marielena Castillo MA

## 2025-04-10 ENCOUNTER — TELEPHONE (OUTPATIENT)
Dept: FAMILY MEDICINE | Facility: CLINIC | Age: 70
End: 2025-04-10
Payer: COMMERCIAL

## 2025-04-10 NOTE — TELEPHONE ENCOUNTER
Patient Quality Outreach    Patient is due for the following:   Physical Preventive Adult Physical    Action(s) Taken:   Schedule a Annual Wellness Visit    Type of outreach:    Sent letter.    Questions for provider review:    None         Airam Rossi MA  Chart routed to None.

## 2025-04-10 NOTE — LETTER
April 10, 2025    Maynor Calero  [address]  [facility] [address]    Dear Maynor,    At [facility] we care about your health and are committed to providing quality patient care.     Here is a list of Health Maintenance topics that are due now or due soon:  Health Maintenance Due   Topic Date Due    LIPID  Never done    ZOSTER IMMUNIZATION (1 of 2) Never done    MEDICARE ANNUAL WELLNESS VISIT  02/16/2018    ADVANCE CARE PLANNING  04/16/2018    COLORECTAL CANCER SCREENING  05/26/2021    Pneumococcal Vaccine: 50+ Years (2 of 2 - PCV) 06/03/2022    INFLUENZA VACCINE (1) Never done    COVID-19 Vaccine (1 - 2024-25 season) Never done    PHQ-2 (once per calendar year)  01/01/2025    DTAP/TDAP/TD IMMUNIZATION (4 - Td or Tdap) 01/28/2025    ANNUAL REVIEW OF HM ORDERS  07/23/2025        We are recommending that you:  Schedule a WELLNESS (Preventative/Physical) APPOINTMENT with your primary care provider. If you go elsewhere for your wellness appointments then please disregard this reminder    To schedule an appointment or discuss this further, you may contact us by phone at the [facility] at [phone] or online through the patient [email or web address] @ [email or web address]    Thank you for trusting [facility] and we appreciate the opportunity to serve you.  We look forward to supporting your healthcare needs in the future.    Your partners in health,      Quality Committee at [facility]

## 2025-08-14 DIAGNOSIS — R35.1 FREQUENT URINATION AT NIGHT: ICD-10-CM

## 2025-08-14 RX ORDER — TAMSULOSIN HYDROCHLORIDE 0.4 MG/1
0.4 CAPSULE ORAL DAILY
Qty: 30 CAPSULE | Refills: 0 | Status: SHIPPED | OUTPATIENT
Start: 2025-08-14